# Patient Record
Sex: MALE | Race: ASIAN | NOT HISPANIC OR LATINO | Employment: PART TIME | URBAN - METROPOLITAN AREA
[De-identification: names, ages, dates, MRNs, and addresses within clinical notes are randomized per-mention and may not be internally consistent; named-entity substitution may affect disease eponyms.]

---

## 2017-03-15 ENCOUNTER — COMMUNICATION - HEALTHEAST (OUTPATIENT)
Dept: FAMILY MEDICINE | Facility: CLINIC | Age: 32
End: 2017-03-15

## 2020-10-22 ENCOUNTER — COMMUNICATION - HEALTHEAST (OUTPATIENT)
Dept: FAMILY MEDICINE | Facility: CLINIC | Age: 35
End: 2020-10-22

## 2020-10-23 ENCOUNTER — OFFICE VISIT - HEALTHEAST (OUTPATIENT)
Dept: FAMILY MEDICINE | Facility: CLINIC | Age: 35
End: 2020-10-23

## 2020-10-23 DIAGNOSIS — L02.211 ABDOMINAL WALL ABSCESS: ICD-10-CM

## 2020-10-23 ASSESSMENT — MIFFLIN-ST. JEOR: SCORE: 1593.02

## 2020-10-26 ENCOUNTER — COMMUNICATION - HEALTHEAST (OUTPATIENT)
Dept: FAMILY MEDICINE | Facility: CLINIC | Age: 35
End: 2020-10-26

## 2020-10-26 ENCOUNTER — OFFICE VISIT - HEALTHEAST (OUTPATIENT)
Dept: FAMILY MEDICINE | Facility: CLINIC | Age: 35
End: 2020-10-26

## 2020-10-26 DIAGNOSIS — L02.211 ABSCESS OF ABDOMINAL WALL: ICD-10-CM

## 2020-10-26 RX ORDER — IBUPROFEN 600 MG/1
600 TABLET, FILM COATED ORAL
Qty: 60 TABLET | Refills: 2 | Status: SHIPPED | OUTPATIENT
Start: 2020-10-26

## 2020-10-26 RX ORDER — ACETAMINOPHEN 500 MG
1000 TABLET ORAL 2 TIMES DAILY
Qty: 100 TABLET | Refills: 3 | Status: SHIPPED | OUTPATIENT
Start: 2020-10-26

## 2020-10-26 RX ORDER — HYDROCODONE BITARTRATE AND ACETAMINOPHEN 5; 325 MG/1; MG/1
1 TABLET ORAL EVERY 4 HOURS PRN
Qty: 20 TABLET | Refills: 0 | Status: SHIPPED | OUTPATIENT
Start: 2020-10-26

## 2020-10-26 ASSESSMENT — MIFFLIN-ST. JEOR: SCORE: 1598.64

## 2020-11-02 ENCOUNTER — OFFICE VISIT - HEALTHEAST (OUTPATIENT)
Dept: FAMILY MEDICINE | Facility: CLINIC | Age: 35
End: 2020-11-02

## 2020-11-02 DIAGNOSIS — L02.211 ABSCESS OF ABDOMINAL WALL: ICD-10-CM

## 2020-11-02 ASSESSMENT — MIFFLIN-ST. JEOR: SCORE: 1609.86

## 2021-02-15 ENCOUNTER — AMBULATORY - HEALTHEAST (OUTPATIENT)
Dept: FAMILY MEDICINE | Facility: CLINIC | Age: 36
End: 2021-02-15

## 2021-02-15 DIAGNOSIS — L85.3 DRY SKIN DERMATITIS: ICD-10-CM

## 2021-02-15 RX ORDER — EMOLLIENT BASE
CREAM (GRAM) TOPICAL
Qty: 454 G | Refills: 11 | Status: SHIPPED | OUTPATIENT
Start: 2021-02-15

## 2021-06-02 ENCOUNTER — RECORDS - HEALTHEAST (OUTPATIENT)
Dept: ADMINISTRATIVE | Facility: CLINIC | Age: 36
End: 2021-06-02

## 2021-06-03 ENCOUNTER — RECORDS - HEALTHEAST (OUTPATIENT)
Dept: ADMINISTRATIVE | Facility: CLINIC | Age: 36
End: 2021-06-03

## 2021-06-05 VITALS
RESPIRATION RATE: 14 BRPM | TEMPERATURE: 98.3 F | OXYGEN SATURATION: 95 % | WEIGHT: 168.9 LBS | SYSTOLIC BLOOD PRESSURE: 90 MMHG | BODY MASS INDEX: 29.93 KG/M2 | HEIGHT: 63 IN | HEART RATE: 60 BPM | DIASTOLIC BLOOD PRESSURE: 60 MMHG

## 2021-06-05 VITALS
HEART RATE: 55 BPM | RESPIRATION RATE: 16 BRPM | TEMPERATURE: 98.1 F | OXYGEN SATURATION: 95 % | HEIGHT: 63 IN | SYSTOLIC BLOOD PRESSURE: 98 MMHG | WEIGHT: 168.19 LBS | BODY MASS INDEX: 29.8 KG/M2 | DIASTOLIC BLOOD PRESSURE: 66 MMHG

## 2021-06-05 VITALS
WEIGHT: 171.38 LBS | HEART RATE: 59 BPM | SYSTOLIC BLOOD PRESSURE: 112 MMHG | DIASTOLIC BLOOD PRESSURE: 78 MMHG | BODY MASS INDEX: 30.37 KG/M2 | RESPIRATION RATE: 16 BRPM | HEIGHT: 63 IN | TEMPERATURE: 98.6 F | OXYGEN SATURATION: 94 %

## 2021-06-12 NOTE — PROGRESS NOTES
"ASSESMENT AND PLAN:  Diagnoses and all orders for this visit:    Abdominal wall abscess  No fluctuation on exam to express more.   Patient wants to cut and squeeze pus out but explained there is no fluid collection or fluctuation to get more out.  Reviewed medication with him, he is taking 2 antibiotics correctly.  He will follow-up early next week.  Off work note provided until Monday.    This transcription uses voice recognition software, which may contain typographical errors.      SUBJECTIVE: Ailyn Mathews is a 35-year-old male here for ED follow-up.  He was seen in the ED on 10/21/2020 due to cellulitis and abscess of abdominal wall.  I&D was done in the ED.  He was sent home with Keflex and Bactrim along with pain medication.  No fever since the ED visit.  He is still having significant pain but improving since I&D done in the ED.  Wound culture preliminary results showed gram-positive cocci.  He is taking 2 antibiotics as prescribed.  He thinks there are\" two more heads\" to come out.  He tried to squeeze himself at home but nothing came out.    No past medical history on file.  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   Social History     Tobacco Use   Smoking Status Former Smoker   Smokeless Tobacco Never Used   Tobacco Comment    chews betel nut       OBJECTICE: BP 98/66 (Patient Site: Left Arm, Patient Position: Sitting, Cuff Size: Adult Regular)   Pulse (!) 55   Temp 98.1  F (36.7  C) (Oral)   Resp 16   Ht 5' 3\" (1.6 m)   Wt 168 lb 3 oz (76.3 kg)   SpO2 95%   BMI 29.79 kg/m  "     DATA REVIEWED:  Additional History from Old Records Summarized (2):  Labs Reviewed or Ordered (1):       GEN-alert,  in no apparent distress.  HEENT- neck is supple.  ABDOMEN- Soft , Open wound left lower abdominal wall. ~7x 3 cm erythema around the open wound.  No fluctuation, no fluid collection.  No purulent drainage noted.  Significant tenderness around the open wound.        Jason Quinn   10/23/2020

## 2021-06-12 NOTE — TELEPHONE ENCOUNTER
----- Message from Kiley Lee MD sent at 10/22/2020 10:47 AM CDT -----  Regarding: appt for ER followup  Please schedule appointment for ER follow-up and abscess recheck with any provider tomorrow Friday, October 23.    s

## 2021-06-12 NOTE — PROGRESS NOTES
"HPI -35-year-old male here to follow-up with a skin abscess on abdominal wall    ER 10/21/20 -was seen in the ER and had an I&D done.  Treated with Keflex and Bactrim.  Wound culture showed resistance to Bactrim  F/u appt 10/23/20 in clinic    He has been taking the medications as prescribed  He said the wound he he closed over on the surface but still had pus underneath.  His wife reopened the abscess with a sharp toothpick and then used a Q-tip and squeezed out all the pus.  She cleaned the wound with cotton and alcohol.    He would like to return to work next week if his lesion has healed.      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     cephalexin (KEFLEX) 500 MG capsule Take 1 capsule (500 mg total) by mouth 4 (four) times a day for 7 days.     HYDROcodone-acetaminophen 5-325 mg per tablet Take 1 tablet by mouth every 4 (four) hours as needed for pain.     sulfamethoxazole-trimethoprim (SEPTRA DS) 800-160 mg per tablet Take 1 tablet by mouth 2 (two) times a day for 7 days.     cholecalciferol, vitamin D3, (VITAMIN D3) 5,000 unit Tab Take 1 tablet (5,000 Units total) by mouth daily.     diclofenac (VOLTAREN) 75 MG EC tablet Take 1 tablet (75 mg total) by mouth 2 (two) times a day.     DULoxetine (CYMBALTA) 60 MG capsule TAKE ONE CAPSULE BY MOUTH ONE TIME DAILY     methyl salicylate-menthol Crea Apply to affected area 2-3 x per day     Vitals:    10/26/20 1058   BP: 90/60   Pulse: 60   Resp: 14   Temp: 98.3  F (36.8  C)   TempSrc: Oral   SpO2: 95%   Weight: 168 lb 14.4 oz (76.6 kg)   Height: 5' 3.15\" (1.604 m)     OBJECTIVE:  Vitals listed above " within normal limits  General appearance: well groomed, pleasant, well hydrated, nontoxic appearing  ENT: PERRL, throat clear  Neck: neck supple, no lymphadenopathy, no thyromegaly  Lungs: lungs clear to auscultation bilaterally, no wheezes or rhonchi  Heart: regular rate and rhythm, no murmurs, rubs or gallops  Abdomen: soft, nontender  Neuro: no focal deficits, CN II-XII grossly intact, alert and oriented  Psych:  mood stable, appears to have good insight and judgment  Skin: Right lower quadrant abdomen 4 x 2 cm abscess that is open with a depth of 3 mm, surrounding induration and erythema    A/P  1. Abscess of abdominal wall  Cyst is already been opened and basically had a home I&D last night  Today in clinic I flushed the wound with saline sterile water and then repacked the dressing with bacitracin ointment  I sent him home with the remainder of the container of sterile water so he can continue to flush and clean the wound at home  Advised that he finish the prescription for Keflex for which he has a few more days worth  Stop Bactrim  New Rx for doxycycline which had sensitivities on wound culture  Pain management with Tylenol and ibuprofen and then Vicodin for as needed pain only  RTC next week to reassess the wound  Work note given to start work next week providing his abscesses healed when he comes to the clinic on Monday.    - HYDROcodone-acetaminophen 5-325 mg per tablet; Take 1 tablet by mouth every 4 (four) hours as needed for pain (for breakthrough pain).  Dispense: 20 tablet; Refill: 0  - acetaminophen (TYLENOL) 500 MG tablet; Take 2 tablets (1,000 mg total) by mouth 2 (two) times a day.  Dispense: 100 tablet; Refill: 3  - ibuprofen (ADVIL,MOTRIN) 600 MG tablet; Take 1 tablet (600 mg total) by mouth 3 (three) times a day with meals.  Dispense: 60 tablet; Refill: 2  - doxycycline (MONODOX) 100 MG capsule; Take 1 capsule (100 mg total) by mouth 2 (two) times a day for 10 days.  Dispense: 20 capsule;  Refill: 0  - bacitracin 500 unit/gram ointment; Apply to affected area daily  Dispense: 30 g; Refill: 0

## 2021-06-12 NOTE — PROGRESS NOTES
"HPI - 34 yo male here to f/u on abdominal wall abscess.   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     HYDROcodone-acetaminophen 5-325 mg per tablet Take 1 tablet by mouth every 4 (four) hours as needed for pain (for breakthrough pain).     ibuprofen (ADVIL,MOTRIN) 600 MG tablet Take 1 tablet (600 mg total) by mouth 3 (three) times a day with meals.     acetaminophen (TYLENOL) 500 MG tablet Take 2 tablets (1,000 mg total) by mouth 2 (two) times a day.     cholecalciferol, vitamin D3, (VITAMIN D3) 5,000 unit Tab Take 1 tablet (5,000 Units total) by mouth daily.     diclofenac (VOLTAREN) 75 MG EC tablet Take 1 tablet (75 mg total) by mouth 2 (two) times a day.     DULoxetine (CYMBALTA) 60 MG capsule TAKE ONE CAPSULE BY MOUTH ONE TIME DAILY     methyl salicylate-menthol Crea Apply to affected area 2-3 x per day     Vitals:    11/02/20 0853   BP: 112/78   Patient Site: Right Arm   Patient Position: Sitting   Cuff Size: Adult Regular   Pulse: (!) 59   Resp: 16   Temp: 98.6  F (37  C)   TempSrc: Oral   SpO2: 94%   Weight: 171 lb 6 oz (77.7 kg)   Height: 5' 3.15\" (1.604 m)     PHYSICAL EXAM   General Appearance: Awake and alert, in no acute distress  HEENT: neck is supple  CV: regular rate  Resp: No respiratory distress. Breathing comfortably  Musculoskeletal: moving limbs comfortably with not deficits or deformities  Skin: LLQ abdomen - 3mm " open lesion with granulation tissue, surrounding induration and erythema but decreased in size from last week. Skin irritation in linear lines around wound c/w tape irritation    A/P  1. Abscess of abdominal wall  Will extend tx of antibiotics and refill doxy  Continue wound care with bacitracin and covering with bandages  Continue ibuprofen for pain and vicodin for breakthrough pain as needed  Call or return to clinic if not improving or symptoms worsening, fever or unable to adequate oral intake.   - doxycycline (MONODOX) 100 MG capsule; Take 1 capsule (100 mg total) by mouth 2 (two) times a day for 10 days.  Dispense: 20 capsule; Refill: 0

## 2021-06-21 NOTE — LETTER
Letter by Kiley Lee MD at      Author: Kiley Lee MD Service: -- Author Type: --    Filed:  Encounter Date: 10/26/2020 Status: (Other)         October 26, 2020     Patient: Ailyn Mathews   YOB: 1985   Date of Visit: 10/26/2020       To Whom It May Concern:    It is my medical opinion that Ailyn Mathews may return to work on 11/2/20.    If you have any questions or concerns, please don't hesitate to call.    Sincerely,        Electronically signed by Kiley Lee MD

## 2021-06-21 NOTE — LETTER
Letter by Jason Quinn MD at      Author: Jason Quinn MD Service: -- Author Type: --    Filed:  Encounter Date: 10/23/2020 Status: (Other)         October 23, 2020     Patient: Ailyn Mathews   YOB: 1985   Date of Visit: 10/23/2020       To Whom it May Concern:    Ailyn Mathews was seen in my clinic on 10/23/2020. Please excuse his absence from 10/23/20-10/26/20.    If you have any questions or concerns, please don't hesitate to call.    Sincerely,         Electronically signed by Jason Quinn MD

## 2021-07-03 NOTE — ADDENDUM NOTE
Addendum Note by Cy Coelho MD at 10/26/2020 10:40 AM     Author: Cy Coelho MD Service: -- Author Type: Physician    Filed: 10/26/2020  6:32 PM Encounter Date: 10/26/2020 Status: Signed    : Cy Coelho MD (Physician)    Addended by: CY COELHO on: 10/26/2020 06:32 PM        Modules accepted: Level of Service

## 2021-07-05 ENCOUNTER — AMBULATORY - HEALTHEAST (OUTPATIENT)
Dept: NURSING | Facility: CLINIC | Age: 36
End: 2021-07-05

## 2021-07-05 ENCOUNTER — TRANSCRIBE ORDERS (OUTPATIENT)
Dept: FAMILY MEDICINE | Facility: CLINIC | Age: 36
End: 2021-07-05

## 2021-07-05 DIAGNOSIS — Z23 HIGH PRIORITY FOR 2019-NCOV VACCINE: Primary | ICD-10-CM

## 2021-08-06 ENCOUNTER — IMMUNIZATION (OUTPATIENT)
Dept: NURSING | Facility: CLINIC | Age: 36
End: 2021-08-06
Attending: FAMILY MEDICINE
Payer: COMMERCIAL

## 2021-08-06 DIAGNOSIS — Z23 HIGH PRIORITY FOR 2019-NCOV VACCINE: ICD-10-CM

## 2021-08-06 PROCEDURE — 91301 PR COVID VAC MODERNA 100 MCG/0.5 ML IM: CPT

## 2021-08-06 PROCEDURE — 0012A PR COVID VAC MODERNA 100 MCG/0.5 ML IM: CPT

## 2023-06-02 DIAGNOSIS — J02.9 SORE THROAT: ICD-10-CM

## 2023-06-02 DIAGNOSIS — Z20.818 STREPTOCOCCUS EXPOSURE: Primary | ICD-10-CM

## 2023-06-02 RX ORDER — AMOXICILLIN 500 MG/1
500 CAPSULE ORAL 2 TIMES DAILY
Qty: 14 CAPSULE | Refills: 0 | Status: SHIPPED | OUTPATIENT
Start: 2023-06-02 | End: 2023-06-09

## 2023-07-17 ENCOUNTER — HOSPITAL ENCOUNTER (EMERGENCY)
Facility: HOSPITAL | Age: 38
Discharge: HOME | End: 2023-07-18
Attending: STUDENT IN AN ORGANIZED HEALTH CARE EDUCATION/TRAINING PROGRAM | Admitting: STUDENT IN AN ORGANIZED HEALTH CARE EDUCATION/TRAINING PROGRAM
Payer: COMMERCIAL

## 2023-07-17 VITALS
OXYGEN SATURATION: 97 % | DIASTOLIC BLOOD PRESSURE: 79 MMHG | RESPIRATION RATE: 18 BRPM | SYSTOLIC BLOOD PRESSURE: 129 MMHG | HEART RATE: 63 BPM | TEMPERATURE: 98.3 F

## 2023-07-17 DIAGNOSIS — K13.79 UVULAR SWELLING: ICD-10-CM

## 2023-07-17 PROCEDURE — 99283 EMERGENCY DEPT VISIT LOW MDM: CPT

## 2023-07-18 PROCEDURE — 250N000012 HC RX MED GY IP 250 OP 636 PS 637: Performed by: STUDENT IN AN ORGANIZED HEALTH CARE EDUCATION/TRAINING PROGRAM

## 2023-07-18 RX ORDER — PREDNISONE 20 MG/1
40 TABLET ORAL ONCE
Status: COMPLETED | OUTPATIENT
Start: 2023-07-18 | End: 2023-07-18

## 2023-07-18 RX ORDER — PREDNISONE 20 MG/1
TABLET ORAL
Qty: 8 TABLET | Refills: 0 | Status: SHIPPED | OUTPATIENT
Start: 2023-07-19

## 2023-07-18 RX ADMIN — PREDNISONE 40 MG: 20 TABLET ORAL at 00:14

## 2023-07-18 NOTE — ED PROVIDER NOTES
NAME: Ailyn Mathews  AGE: 38 year old male  YOB: 1985  MRN: [id number]  EVALUATION DATE & TIME: 2023 11:47 PM    PCP: Kiley Lee  ED PROVIDER: Jessica Ochoa MD.    Chief Complaint   Patient presents with     Mouth Problem       FINAL IMPRESSION:  1. Uvular swelling        MEDICAL DECISION MAKIN:51 PM I met with the patient, obtained history, performed an initial exam, and discussed options and plan for diagnostics and treatment here in the ED.   12:10 AM We discussed plans for discharge.     38-year-old male who presents with mouth concern.  Patient reports he was scraping his tongue yesterday when he injured his uvula.  On exam his uvula is swollen with some ecchymosis. No bleeding. He is handling secretions without issues and vitals are reassuring.  His presentation is not suggestive of infectious process or anaphylaxis.  Will start on steroids for a few days to help with the swelling.  Discussed other symptomatic management at home.  Recommended follow-up with his primary care doctor.  Strict return precautions discussed and patient is in agreement the plan and his questions were answered.    Medical Decision Making    History:    Supplemental history from: Documented in chart, if applicable    External Record(s) reviewed: Documented in chart, if applicable.    Work Up:    Chart documentation includes differential considered and any EKGs or imaging interpreted by provider.    In additional to work up documented, I considered the following work up: Documented in chart, if applicable.    External consultation:    Discussion of management with another provider: Documented in chart, if applicable    Complicating factors:    Care impacted by chronic illness: N/A    Care affected by social determinants of health: N/A    Disposition considerations: Discharge. I prescribed additional prescription strength medication(s) as charted. N/A.    MEDICATIONS GIVEN IN THE EMERGENCY:  Medications    predniSONE (DELTASONE) tablet 40 mg (has no administration in time range)       NEW PRESCRIPTIONS STARTED AT TODAY'S ER VISIT:  New Prescriptions    PREDNISONE (DELTASONE) 20 MG TABLET    Take two tablets (= 40mg) each day for 4 (four) days        =================================================================  HPI    Patient information was obtained from: Patient   Use of :Yes ( Phone) - Language Maddi Mathews is a 38 year old male with a past medical history of vitamin D deficiency, who presents via walk in for evaluation of mouth problem.     Yesterday, the patient was cleaning his tongue with a metal spoon. He injured his uvula which started bleeding. Today, there are noticeable bruising and swelling to his uvula. No bleeding today. When swallowing food and fluids it is uncomfortable. He notes that it feels like something is stuck. He denies any shortness of breath, fever, chills, or any other medical concerns.     PAST MEDICAL HISTORY:  History reviewed. No pertinent past medical history.    PAST SURGICAL HISTORY:  History reviewed. No pertinent surgical history.    CURRENT MEDICATIONS:      Current Facility-Administered Medications:      predniSONE (DELTASONE) tablet 40 mg, 40 mg, Oral, Once, Jessica Ochoa MD    Current Outpatient Medications:      [START ON 7/19/2023] predniSONE (DELTASONE) 20 MG tablet, Take two tablets (= 40mg) each day for 4 (four) days, Disp: 8 tablet, Rfl: 0     acetaminophen (TYLENOL) 500 MG tablet, [ACETAMINOPHEN (TYLENOL) 500 MG TABLET] Take 2 tablets (1,000 mg total) by mouth 2 (two) times a day., Disp: 100 tablet, Rfl: 3     cholecalciferol, vitamin D3, (VITAMIN D3) 5,000 unit Tab, [CHOLECALCIFEROL, VITAMIN D3, (VITAMIN D3) 5,000 UNIT TAB] Take 1 tablet (5,000 Units total) by mouth daily., Disp: 100 tablet, Rfl: 4     diclofenac (VOLTAREN) 75 MG EC tablet, [DICLOFENAC (VOLTAREN) 75 MG EC TABLET] Take 1 tablet (75 mg total) by mouth 2 (two) times a day., Disp:  60 tablet, Rfl: 2     DULoxetine (CYMBALTA) 60 MG capsule, [DULOXETINE (CYMBALTA) 60 MG CAPSULE] TAKE ONE CAPSULE BY MOUTH ONE TIME DAILY, Disp: 90 capsule, Rfl: 2     emollient (BIAFINE) cream, [EMOLLIENT (BIAFINE) CREAM] Apply to hands three times a day as needed for dry cracked hands, Disp: 454 g, Rfl: 11     HYDROcodone-acetaminophen 5-325 mg per tablet, [HYDROCODONE-ACETAMINOPHEN 5-325 MG PER TABLET] Take 1 tablet by mouth every 4 (four) hours as needed for pain (for breakthrough pain)., Disp: 20 tablet, Rfl: 0     ibuprofen (ADVIL,MOTRIN) 600 MG tablet, [IBUPROFEN (ADVIL,MOTRIN) 600 MG TABLET] Take 1 tablet (600 mg total) by mouth 3 (three) times a day with meals., Disp: 60 tablet, Rfl: 2     MEDICATION CANNOT BE REORDERED - PLEASE MANUALLY REORDER AND DISCONTINUE THE OLD ORDER, [METHYL SALICYLATE-MENTHOL CREA] Apply to affected area 2-3 x per day, Disp: 90 g, Rfl: 11    ALLERGIES:  No Known Allergies    FAMILY HISTORY:  No family history on file.    SOCIAL HISTORY:   Social History     Socioeconomic History     Marital status:    Tobacco Use     Smoking status: Former     Smokeless tobacco: Never     Tobacco comments:     chews betel nut   Substance and Sexual Activity     Alcohol use: No     Drug use: No     Sexual activity: Yes     Partners: Female     Birth control/protection: Implant       PHYSICAL EXAM:    Vitals: /79   Pulse 63   Temp 98.3  F (36.8  C) (Oral)   Resp 18   SpO2 97%    Constitutional: Well developed, well nourished. Comfortable appearing.  HENT: Normocephalic, atraumatic, mucous membranes moist, nose normal  Mouth: uvula is swollen with some ecchymosis to the distal aspect, no erythema or exudates, no other tongue, lips or posterior oropharynx swelling. No bleeding. Handling secretions without issue.  Eyes: Pupils mid range, sclera white  Neck- Gross ROM intact.   Respiratory: Normal work of breathing, normal rate, speaks in full sentences  Cardiovascular: Normal heart  rate  Musculoskeletal: Moving all 4 extremities intentionally and without pain.  Neurologic: Alert & oriented, cranial nerves grossly intact. Speech clear. Moving all extremities    LAB:  All pertinent labs reviewed and interpreted.  Labs Ordered and Resulted from Time of ED Arrival to Time of ED Departure - No data to display    RADIOLOGY:  No orders to display       PROCEDURES:   Procedures     I, Rosalind Russo, am serving as a scribe to document services personally performed by Dr. Jessica Ochoa based on my observation and the provider's statements to me. I, Jessica Ochoa MD attest that Rosalind Her is acting in a scribe capacity, has observed my performance of the services and has documented them in accordance with my direction.    Jessica Ochoa M.D.  Emergency Medicine  [facility] EMERGENCY DEPARTMENT  [facility] [facility] [id number] [phone]  Dept: [phone]      Jessica Ochoa MD  07/18/23 0030

## 2023-07-18 NOTE — ED TRIAGE NOTES
"Ailyn reports that he was cleaning his tongue with a spoon and hit his uvula and it was bleeding. Now when he eats or drinks it feels \"not good\" and like something is stuck.     Triage Assessment     Row Name 07/17/23 4856       Triage Assessment (Adult)    Airway WDL WDL       Respiratory WDL    Respiratory WDL WDL       Skin Circulation/Temperature WDL    Skin Circulation/Temperature WDL WDL       Cardiac WDL    Cardiac WDL WDL       Peripheral/Neurovascular WDL    Peripheral Neurovascular WDL WDL       Cognitive/Neuro/Behavioral WDL    Cognitive/Neuro/Behavioral WDL WDL              "

## 2023-07-18 NOTE — DISCHARGE INSTRUCTIONS
Your uvula is swollen and bruised  Please the steroids to help with swelling  You can also sip or gargle ice water  You can also take 400 mg of ibuprofen with food every 6-8 hours (no more than 3200 mg in 24 hours).     Follow up with your primary care doctor  Return to the Emergency Room with fevers, unable to keep down fluids, significant difficulty breathing or other worsening symptoms